# Patient Record
Sex: FEMALE | Race: OTHER | HISPANIC OR LATINO | URBAN - METROPOLITAN AREA
[De-identification: names, ages, dates, MRNs, and addresses within clinical notes are randomized per-mention and may not be internally consistent; named-entity substitution may affect disease eponyms.]

---

## 2024-02-06 ENCOUNTER — EMERGENCY (EMERGENCY)
Facility: HOSPITAL | Age: 59
LOS: 1 days | Discharge: HOME | End: 2024-02-06
Attending: EMERGENCY MEDICINE
Payer: COMMERCIAL

## 2024-02-06 VITALS
TEMPERATURE: 98 F | HEART RATE: 89 BPM | SYSTOLIC BLOOD PRESSURE: 130 MMHG | RESPIRATION RATE: 16 BRPM | WEIGHT: 145.95 LBS | DIASTOLIC BLOOD PRESSURE: 80 MMHG | OXYGEN SATURATION: 99 % | HEIGHT: 62 IN

## 2024-02-06 DIAGNOSIS — Z90.3 ACQUIRED ABSENCE OF STOMACH [PART OF]: Chronic | ICD-10-CM

## 2024-02-06 DIAGNOSIS — Z90.710 ACQUIRED ABSENCE OF BOTH CERVIX AND UTERUS: Chronic | ICD-10-CM

## 2024-02-06 DIAGNOSIS — Z98.890 OTHER SPECIFIED POSTPROCEDURAL STATES: Chronic | ICD-10-CM

## 2024-02-06 PROCEDURE — 70450 CT HEAD/BRAIN W/O DYE: CPT | Mod: 26,MA

## 2024-02-06 PROCEDURE — 73090 X-RAY EXAM OF FOREARM: CPT | Mod: 26,LT

## 2024-02-06 PROCEDURE — 90715 TDAP VACCINE 7 YRS/> IM: CPT

## 2024-02-06 PROCEDURE — 99284 EMERGENCY DEPT VISIT MOD MDM: CPT | Mod: 25

## 2024-02-06 PROCEDURE — 70450 CT HEAD/BRAIN W/O DYE: CPT | Mod: MA

## 2024-02-06 PROCEDURE — 90471 IMMUNIZATION ADMIN: CPT

## 2024-02-06 PROCEDURE — 73110 X-RAY EXAM OF WRIST: CPT

## 2024-02-06 PROCEDURE — 70480 CT ORBIT/EAR/FOSSA W/O DYE: CPT | Mod: 26,59,MA

## 2024-02-06 PROCEDURE — 72125 CT NECK SPINE W/O DYE: CPT | Mod: MA

## 2024-02-06 PROCEDURE — 73090 X-RAY EXAM OF FOREARM: CPT

## 2024-02-06 PROCEDURE — 99285 EMERGENCY DEPT VISIT HI MDM: CPT

## 2024-02-06 PROCEDURE — 70480 CT ORBIT/EAR/FOSSA W/O DYE: CPT | Mod: MA

## 2024-02-06 PROCEDURE — 72125 CT NECK SPINE W/O DYE: CPT | Mod: 26,MA

## 2024-02-06 PROCEDURE — 73110 X-RAY EXAM OF WRIST: CPT | Mod: 26,RT

## 2024-02-06 RX ORDER — BACITRACIN ZINC 500 UNIT/G
1 OINTMENT IN PACKET (EA) TOPICAL ONCE
Refills: 0 | Status: COMPLETED | OUTPATIENT
Start: 2024-02-06 | End: 2024-02-06

## 2024-02-06 RX ORDER — TETANUS TOXOID, REDUCED DIPHTHERIA TOXOID AND ACELLULAR PERTUSSIS VACCINE, ADSORBED 5; 2.5; 8; 8; 2.5 [IU]/.5ML; [IU]/.5ML; UG/.5ML; UG/.5ML; UG/.5ML
0.5 SUSPENSION INTRAMUSCULAR ONCE
Refills: 0 | Status: COMPLETED | OUTPATIENT
Start: 2024-02-06 | End: 2024-02-06

## 2024-02-06 RX ORDER — ACETAMINOPHEN 500 MG
975 TABLET ORAL ONCE
Refills: 0 | Status: COMPLETED | OUTPATIENT
Start: 2024-02-06 | End: 2024-02-06

## 2024-02-06 RX ADMIN — Medication 1 APPLICATION(S): at 13:10

## 2024-02-06 RX ADMIN — Medication 975 MILLIGRAM(S): at 13:10

## 2024-02-06 RX ADMIN — TETANUS TOXOID, REDUCED DIPHTHERIA TOXOID AND ACELLULAR PERTUSSIS VACCINE, ADSORBED 0.5 MILLILITER(S): 5; 2.5; 8; 8; 2.5 SUSPENSION INTRAMUSCULAR at 13:11

## 2024-02-06 NOTE — ED PROVIDER NOTE - ATTENDING APP SHARED VISIT CONTRIBUTION OF CARE
seen with acp  slipped and fell injuring right forehead right eyebrow  and right wrist   no loc no swelling no deformity right wrist  ct of head is negative  xray right wrist is negative  agree with acps assessment hx and physical and disposition

## 2024-02-06 NOTE — ED PROVIDER NOTE - PATIENT PORTAL LINK FT
You can access the FollowMyHealth Patient Portal offered by [facility] by registering at the following website: [email or web address]. By joining [employer]’s FollowMyHealth portal, you will also be able to view your health information using other applications (apps) compatible with our system.

## 2024-02-06 NOTE — ED ADULT NURSE NOTE - NSFALLUNIVINTERV_ED_ALL_ED
Bed/Stretcher in lowest position, wheels locked, appropriate side rails in place/Call bell, personal items and telephone in reach/Instruct patient to call for assistance before getting out of bed/chair/stretcher/Non-slip footwear applied when patient is off stretcher/[place] to call system/Physically safe environment - no spills, clutter or unnecessary equipment/Purposeful proactive rounding/Room/bathroom lighting operational, light cord in reach

## 2024-02-06 NOTE — ED PROVIDER NOTE - CARE PLAN
1 Principal Discharge DX:	Right wrist sprain  Secondary Diagnosis:	Facial contusion, initial encounter  Secondary Diagnosis:	Head injury

## 2024-02-06 NOTE — ED PROVIDER NOTE - CLINICAL SUMMARY MEDICAL DECISION MAKING FREE TEXT BOX
58-year-old female, presents for evaluation status post mechanical fall earlier today.  Appears uncomfortable, nontoxic.  No focal neurodeficit on exam.  Vital signs stable, afebrile.  At this time patient will require x-ray to assess for fracture and CT to assess for acute intracranial injury, fracture.  Will give Tylenol, update tetanus immunization and clean the abrasion with bacitracin dressing. 58-year-old female, presents for evaluation status post mechanical fall earlier today.  Appears uncomfortable, nontoxic.  No focal neurodeficit on exam.  Vital signs stable, afebrile.  At this time patient will require x-ray to assess for fracture and CT to assess for acute intracranial injury, fracture.  Will give Tylenol, update tetanus immunization and clean the abrasion with bacitracin dressing.    15: 09–patient feels better.  No focal neurodeficit on exam.  CT and x-ray shows no acute finding.  Diagnosis right wrist sprain.   Velcro wrist splint applied.  Plan for outpatient follow-up with PMD.  Discussed red flags to come back to the hospital regarding upper extremity injury and head injury.

## 2024-02-06 NOTE — ED PROVIDER NOTE - OBJECTIVE STATEMENT
58-year-old female, history of  sleeve gastrectomy, hysterectomy, thyroid surgery, presents for evaluation status post fall earlier today.   While walking tripped and fell forward landing on her right arm and hitting the right side of her head/face on the ground.  Denies any LOC.  Denies any weakness or dizziness prior or after the fall.  Was ambulatory after the fall.  Right now reports pain to the side of the face that she hit on the ground right wrist pain right forearm pain.  Pain is sharp, worse with movement.  Did not take any medication for pain prior to arrival.  Denies any vision changes or difficulty moving eyes.  Reports minimal discomfort to the neck area.  Denies any back pain.  No shooting pain to the extremities.  Denies any numbness, tingling, focal weakness, chest pain, shortness of breath, abdominal pain or any other complaints.

## 2024-02-06 NOTE — ED ADULT NURSE NOTE - NSICDXPASTSURGICALHX_GEN_ALL_CORE_FT
Hernia Sac, Colostomy and Excess Stoma skin PAST SURGICAL HISTORY:  H/O: hysterectomy     History of sleeve gastrectomy     History of thyroid surgery

## 2024-02-06 NOTE — ED PROVIDER NOTE - PHYSICAL EXAMINATION
Right eyebrow/forehead with ecchymosis and swelling with tenderness to palpation.  Small abrasions adjacent near the hairline.  Pupils 6 mm with PERRL and EOMI bilaterally.  Nose without swelling or tenderness.  No septal hematoma.  No jaw tenderness.  Neck supple and full range of motion.  Mild midline C6-C7 tenderness to palpation.  Right shoulder and elbow full range of motion without swelling or tenderness.  Mild tenderness along the forearm specifically at the distal ulnar area.  No snuffbox tenderness.  All fingers full range of motion with cap refill less than 2 seconds.  Distal pulses intact 2+ bilaterally.  No ecchymosis or tenderness to the chest abdomen or pelvis.  Ambulatory with steady gait.

## 2024-02-06 NOTE — ED ADULT NURSE NOTE - OBJECTIVE STATEMENT
Patient presented to the ED with c/o right side of the head and wrist pain s/p trip and fall. Denies any loss of consciousness.

## 2024-02-06 NOTE — ED PROVIDER NOTE - NSICDXPASTSURGICALHX_GEN_ALL_CORE_FT
PAST SURGICAL HISTORY:  H/O: hysterectomy     History of sleeve gastrectomy     History of thyroid surgery

## 2024-02-06 NOTE — ED PROVIDER NOTE - NSFOLLOWUPINSTRUCTIONS_ED_ALL_ED_FT
Seguimiento con el médico de atención primaria en 3-5 días.    Para el dolor, puede melva ibuprofeno de venta mateusz, 600 mg por vía oral cada 6 horas, según sea necesario para el dolor. [place] los medicamentos con la comida.    Si experimenta algún síntoma nuevo o que empeora, o si está preocupado, siempre puede regresar a emergencias para juan reevaluación.    * * *    Follow-up with the primary care doctor in 3-5 days.    For pain you can take over the counter Ibuprofen 600 mg orally every 6 hours as needed for pain. Take medication with food.     If you experience any new or worsening symptoms or if you are concerned you can always come back to the emergency for a re-evaluation.

## 2024-02-08 ENCOUNTER — EMERGENCY (EMERGENCY)
Facility: HOSPITAL | Age: 59
LOS: 1 days | Discharge: HOME | End: 2024-02-08
Attending: STUDENT IN AN ORGANIZED HEALTH CARE EDUCATION/TRAINING PROGRAM
Payer: COMMERCIAL

## 2024-02-08 VITALS
SYSTOLIC BLOOD PRESSURE: 100 MMHG | RESPIRATION RATE: 19 BRPM | OXYGEN SATURATION: 98 % | TEMPERATURE: 98 F | HEART RATE: 72 BPM | DIASTOLIC BLOOD PRESSURE: 53 MMHG

## 2024-02-08 VITALS
SYSTOLIC BLOOD PRESSURE: 119 MMHG | RESPIRATION RATE: 19 BRPM | WEIGHT: 147.71 LBS | OXYGEN SATURATION: 98 % | HEIGHT: 62 IN | TEMPERATURE: 98 F | HEART RATE: 80 BPM | DIASTOLIC BLOOD PRESSURE: 66 MMHG

## 2024-02-08 DIAGNOSIS — Z98.890 OTHER SPECIFIED POSTPROCEDURAL STATES: Chronic | ICD-10-CM

## 2024-02-08 DIAGNOSIS — Z90.3 ACQUIRED ABSENCE OF STOMACH [PART OF]: Chronic | ICD-10-CM

## 2024-02-08 DIAGNOSIS — Z90.710 ACQUIRED ABSENCE OF BOTH CERVIX AND UTERUS: Chronic | ICD-10-CM

## 2024-02-08 PROCEDURE — 73030 X-RAY EXAM OF SHOULDER: CPT | Mod: 26,RT

## 2024-02-08 PROCEDURE — 99284 EMERGENCY DEPT VISIT MOD MDM: CPT | Mod: 25

## 2024-02-08 PROCEDURE — 71250 CT THORAX DX C-: CPT | Mod: MA

## 2024-02-08 PROCEDURE — 99285 EMERGENCY DEPT VISIT HI MDM: CPT

## 2024-02-08 PROCEDURE — 93010 ELECTROCARDIOGRAM REPORT: CPT

## 2024-02-08 PROCEDURE — 71250 CT THORAX DX C-: CPT | Mod: 26,MA

## 2024-02-08 PROCEDURE — 93005 ELECTROCARDIOGRAM TRACING: CPT

## 2024-02-08 PROCEDURE — 73030 X-RAY EXAM OF SHOULDER: CPT

## 2024-02-08 RX ORDER — ACETAMINOPHEN 500 MG
975 TABLET ORAL ONCE
Refills: 0 | Status: COMPLETED | OUTPATIENT
Start: 2024-02-08 | End: 2024-02-08

## 2024-02-08 RX ORDER — LIDOCAINE 4 G/100G
1 CREAM TOPICAL ONCE
Refills: 0 | Status: COMPLETED | OUTPATIENT
Start: 2024-02-08 | End: 2024-02-08

## 2024-02-08 RX ORDER — CYCLOBENZAPRINE HYDROCHLORIDE 10 MG/1
5 TABLET, FILM COATED ORAL ONCE
Refills: 0 | Status: COMPLETED | OUTPATIENT
Start: 2024-02-08 | End: 2024-02-08

## 2024-02-08 RX ORDER — CYCLOBENZAPRINE HYDROCHLORIDE 10 MG/1
1 TABLET, FILM COATED ORAL
Qty: 15 | Refills: 0
Start: 2024-02-08

## 2024-02-08 RX ADMIN — Medication 975 MILLIGRAM(S): at 18:08

## 2024-02-08 RX ADMIN — LIDOCAINE 1 PATCH: 4 CREAM TOPICAL at 18:09

## 2024-02-08 RX ADMIN — CYCLOBENZAPRINE HYDROCHLORIDE 5 MILLIGRAM(S): 10 TABLET, FILM COATED ORAL at 21:24

## 2024-02-08 NOTE — ED PROVIDER NOTE - NSFOLLOWUPINSTRUCTIONS_ED_ALL_ED_FT
Fractura de oni  Rib Fracture       Juan fractura de oni es la ruptura de sonja de los huesos que la maria luz. Las costillas son huesos largos y curvos que rodean el pecho y están unidos a la columna vertebral y al esternón. Protegen al corazón, los pulmones y otros órganos que se encuentran en el pecho.    Juan fractura o fisura de oni puede ser dolorosa, mike no suele ser grave. La mayoría de las fracturas de costillas se curan solas luego de un tiempo. Sin embargo, pueden llegar a ser más graves si se rompen varias costillas o si se desplazan y empujan otras estructuras u órganos.    ¿Cuáles son las causas?  Las causas de esta afección son las siguientes:    Los movimientos repetitivos que requieren mucha fuerza, meg lanzar juan pelota en el béisbol o tener episodios de tos intensos.  Un golpe directo en el pecho, meg juan lesión deportiva, un accidente automovilístico o juan caída.  Cáncer que se extendió a los huesos, lo que puede debilitarlos y provocarles fracturas.    ¿Cuáles son los signos o los síntomas?  Los síntomas de esta afección incluyen los siguientes:    Dolor al inspirar o al toser.  Dolor cuando alguien presiona la alena lesionada.  Falta de aire.    ¿Cómo se diagnostica?  Esta afección se diagnostica mediante un examen físico y los antecedentes médicos. Además, pueden realizarse estudios de diagnóstico por imágenes, por ejemplo:    Radiografía de tórax.  Exploración por tomografía computarizada (TC).  Resonancia magnética (RM).  Gammagrafía ósea.  Ecografía de tórax.    ¿Cómo se trata?  El tratamiento de esta afección depende de la gravedad de la fractura. La mayoría de las fracturas de costillas se curan solas en 1 a 3 meses. No obstante, la curación puede demorar más tiempo cuando hay tos melissa o se realizan otras actividades que empeoran la lesión (factores agravantes). Le darán analgésicos para controlar el dolor genet el proceso de curación. Le enseñarán a realizar ejercicios de respiración profunda.    En el nj de las lesiones graves, es posible que deba ser hospitalizado o someterse a juan cirugía.    Siga estas indicaciones en burk casa:      Control del dolor, la rigidez y la hinchazón    Si se lo indican, aplique hielo sobre la alena lesionada.    Ponga el hielo en juan bolsa plástica.  Coloque juan toalla entre la piel y la bolsa de hielo.  Coloque el hielo genet 20 minutos, de 2 a 3 veces por día.  [place] los medicamentos de venta mateusz y los recetados solamente meg se lo haya indicado el médico.        Actividad    No realice actividades en demasía ni actividades o movimientos que le causen dolor. Realice las actividades con cuidado y evite golpearse la oni lesionada.  Aumente la cantidad de actividades que realice de forma gradual meg se lo haya indicado el médico.        Instrucciones generales    Ana Paula ejercicios de respiración profunda meg se lo haya indicado el médico. [place] ayudará a evitar la neumonía, que es juan complicación frecuente de las fracturas de oni. El médico puede indicarle que ana paula lo siguiente:    Ana Paula respiraciones profundas varias veces al día.  Trate de toser varias veces al día, con el área lesionada sostenida con juan almohada.  Use un dispositivo llamado espirómetro de incentivo para realizar respiraciones profundas varias veces por día.  Estee suficiente líquido para mantener la orina de color amarillo pálido.  No use cinturones ni sujetadores. Esta limitación de la respiración puede causar neumonía.  Concurra a todas las visitas de seguimiento meg se lo haya indicado el médico. [place] es importante.    Comuníquese con un médico si:  Tiene fiebre.    Solicite ayuda de inmediato si:  Tiene dificultad para respirar o le falta el aire.  Tiene tos permanente o expectora un esputo espeso o con katherine.  Tiene náuseas, vómitos o dolor abdominal.  El dolor empeora y los medicamentos no hacen efecto.    Resumen  Juan fractura de oni es la ruptura de sonja de los huesos que la maria luz.  Juan fractura o fisura de oni puede ser dolorosa, mike no suele ser grave.  La mayoría de las fracturas de costillas se curan solas luego de un tiempo.  El tratamiento de esta afección depende de la gravedad de la fractura.  No realice actividades en demasía ni actividades o movimientos que le causen dolor.

## 2024-02-08 NOTE — ED ADULT NURSE NOTE - OBJECTIVE STATEMENT
pt c/o right-sided chest wall and shoulder pain associated with bruising of right shoulder s/p fall 2 days ago. bruising to R orbital area noted.

## 2024-02-08 NOTE — ED PROVIDER NOTE - OBJECTIVE STATEMENT
#[id number]    58-year-old female with no pertinent past medical history presents with right upper chest discomfort and shoulder pain status post fall 2 days ago.  Patient states she tripped and fell 2 days ago, was seen at 2 days ago in emergency department with negative CT head and negative x-rays of right wrist.  Patient reports persistent right-sided chest wall and shoulder pain associated with bruising of right shoulder.  Last dose of Tylenol last night.  Denies any fevers, shortness of breath, abdominal pain, vomiting, diarrhea, numbness, weakness, or rash.  Denies any aspirin or anticoagulation use.  Denies any additional complaints.

## 2024-02-08 NOTE — ED ADULT TRIAGE NOTE - CHIEF COMPLAINT QUOTE
as per pt seen in ed last tuesday for fall,today with bruising to my right shouldr and pain on my right breast /rib, pt states I would like a ultrsound

## 2024-02-08 NOTE — ED ADULT NURSE NOTE - PAIN: PRECIPITATING/AGGRAVATING FACTORS
Patient stopped by  and wanted to ask Dr. Garzon if she could get the Covid shot.   activity/movement

## 2024-02-08 NOTE — ED ADULT NURSE NOTE - NSFALLUNIVINTERV_ED_ALL_ED
Bed/Stretcher in lowest position, wheels locked, appropriate side rails in place/Call bell, personal items and telephone in reach/Instruct patient to call for assistance before getting out of bed/chair/stretcher/Non-slip footwear applied when patient is off stretcher/[place] to call system/Physically safe environment - no spills, clutter or unnecessary equipment/Purposeful proactive rounding/Room/bathroom lighting operational, light cord in reach

## 2024-02-08 NOTE — ED PROVIDER NOTE - PROGRESS NOTE DETAILS
Morales: pt seen and re-evaluated at bedside.  Pt states their symptoms have improved.  Pt comfortable in NAD.  CT showing 3rd rib fx, pain well controlled, incentive spirometer provided. Will DC with PMD follow up/return precautions.

## 2024-02-08 NOTE — ED PROVIDER NOTE - PATIENT PORTAL LINK FT
You can access the FollowMyHealth Patient Portal offered by [facility] by registering at the following website: [email or web address]. By joining [employer]’s FollowMyHealth portal, you will also be able to view your health information using other applications (apps) compatible with our system.

## 2024-02-08 NOTE — ED PROVIDER NOTE - PHYSICAL EXAMINATION
CONSTITUTIONAL: non-toxic, well appearing  SKIN: no rash, no petechiae.  EYES: PERRL, EOMI, pink conjunctiva, anicteric  ENT: tongue and uvular midline, no exudates, moist mucous membranes  NECK: Supple; no meningismus, no JVD  CARD: RRR, no murmurs, equal radial pulses bilaterally 2+. Chaperone PCA Gisele. No breast tenderness, ecchymosis, or nipple discharge. Tender over lateral ribs over right chest wall.   RESP: CTAB, no respiratory distress  ABD: Soft, non-tender, non-distended, no peritoneal signs, no CVA tenderness  EXT: Normal ROM x4. Right shoulder pain with movement, no bony tenderness, Ecchymosis over right anterior shoulder. No edema.   NEURO: Alert, oriented. Neuro exam nonfocal, equal strength bilaterally.   PSYCH: Cooperative, appropriate.

## 2024-02-08 NOTE — ED PROVIDER NOTE - CLINICAL SUMMARY MEDICAL DECISION MAKING FREE TEXT BOX
Morales: 58-year-old female with no pertinent past medical history presents with right upper chest discomfort and shoulder pain status post fall 2 days ago.  Patient states she tripped and fell 2 days ago, was seen at 2 days ago in emergency department with negative CT head and negative x-rays of right wrist.  Patient reports persistent right-sided chest wall and shoulder pain associated with bruising of right shoulder.  Last dose of Tylenol last night.  Denies any fevers, shortness of breath, abdominal pain, vomiting, diarrhea, numbness, weakness, or rash.  Denies any aspirin or anticoagulation use.  Physical exam per above. Breast unremarkable, suspect rib fx vs. chest wall contusion. Extremities NVI. Pain reproducible, not consistent with PE or ACS. Will obtain imaging r/o fx, provide supportive treatment with dispo pending workup.